# Patient Record
(demographics unavailable — no encounter records)

---

## 2024-11-20 NOTE — DISCUSSION/SUMMARY
[Normal Growth] : growth [Normal Development] : development  [No Elimination Concerns] : elimination [Continue Regimen] : feeding [No Skin Concerns] : skin [Normal Sleep Pattern] : sleep [None] : no medical problems [Anticipatory Guidance Given] : Anticipatory guidance addressed as per the history of present illness section [Physical Growth and Development] : physical growth and development [Social and Academic Competence] : social and academic competence [Emotional Well-Being] : emotional well-being [Risk Reduction] : risk reduction [Violence and Injury Prevention] : violence and injury prevention [No Vaccines] : no vaccines needed [No Medications] : ~He/She~ is not on any medications [Patient] : patient [Parent/Guardian] : Parent/Guardian [Full Activity without restrictions including Physical Education & Athletics] : Full Activity without restrictions including Physical Education & Athletics [] : The components of the vaccine(s) to be administered today are listed in the plan of care. The disease(s) for which the vaccine(s) are intended to prevent and the risks have been discussed with the caretaker.  The risks are also included in the appropriate vaccination information statements which have been provided to the patient's caregiver.  The caregiver has given consent to vaccinate. [Met privately with the adolescent for part of the office visit?] : Met privately with the adolescent for part of the office visit? Yes [Adolescent demonstrates understanding of his/her conditions and how to take prescribed medications?] : Adolescent demonstrates understanding of his/her conditions and how to take prescribed medications? Yes [Adolescent asks questions during each office  visit and participates in the care plan?] : Adolescent asks questions during each office visit and participates in the care plan? Yes [Adolescent is competent in independently making appointments, filling prescriptions, following up on referrals, and seeking emergency services, as needed?] : Adolescent is competent in independently making appointments, filling prescriptions, following up on referrals, and seeking emergency services, as needed? Yes [Discussed using Follow My Health to access health records and communicate with the adolescent's care team?] : Discussed using Follow My Health to access health records and communicate with the adolescent's care team? Yes [Initiated discussion about transfer to an adult healthcare provider?] : Initiated discussion about transfer to an adult healthcare provider? Yes  [Discussed choices for adult care and assist in identifying possible care providers?] : Discussed choices for adult care and assist in identifying possible care providers? Yes [Initiated communication with the adult provider that the family and adolescent has selected?] : Initiated communication with the adult provider that the family and adolescent has selected? No [Provided copy of  transition letter?] : Provided copy of transition letter? No [FreeTextEntry1] : Continue balanced diet with all food groups. Brush teeth twice a day with toothbrush. Recommend visit to dentist. Maintain consistent daily routines and sleep schedule. Personal hygiene, puberty, and sexual health reviewed. Risky behaviors assessed. School discussed. Limit screen time to no more than 2 hours per day. Encourage physical activity. Cardiac questionnaire reviewed, NO issues. Flu, HPV and Hep A Vaccination not carried out due to patient refusal .  I spent adequate time to explain the pros and cons of giving and not giving the vaccines.  Vaccine info sheets were given to patient for reference.  The patient is fully aware of the risks and consequences of refusing to get immunized and accepts them. Discussed in the preferred language of English Return 1 year for routine well child check.

## 2024-11-20 NOTE — BEGINNING OF VISIT
Change of pharmacy     No protocol for requested medication.    Medication: TACROlimus (PROGRAF) 1 MG capsule   Last office visit date: 9/29/2022  [id number] - [place] [id number] [facility] [address]    Order pended, routed to clinician for review.     [Patient] : patient

## 2024-11-20 NOTE — HISTORY OF PRESENT ILLNESS
[Yes] : Patient goes to dentist yearly [Up to date] : Up to date [No] : Patient has not had sexual intercourse [HIV Screening Declined] : HIV Screening Declined [Has ways to cope with stress] : has ways to cope with stress [Displays self-confidence] : displays self-confidence [Has problems with sleep] : does not have problems with sleep [Gets depressed, anxious, or irritable/has mood swings] : does not get depressed, anxious, or irritable/has mood swings [Has thought about hurting self or considered suicide] : has not thought about hurting self or considered suicide [With Teen] : teen [de-identified] : self [FreeTextEntry7] : 18 Y/O WC [de-identified] : none [NO] : No [FreeTextEntry1] : Patient is doing well - has no concerns or issues. No change in health status since last WCC No chest pains or difficulty of breathing reported No reactions to previous vaccinations. Denies depression or psychiatric issues. No mental health issues, not in counseling. No suicidal ideations Appetite good, no issues No new allergies reported Not sexually active Denies tobacco, ETOH, drug use or vaping No tobacco exposure Sleeping well with good sleeping patterns No recent severe illness or injury No emergency room visits No trauma to the head /concussion. 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

## 2024-11-20 NOTE — PHYSICAL EXAM
[Alert] : alert [No Acute Distress] : no acute distress [Normocephalic] : normocephalic [EOMI Bilateral] : EOMI bilateral [Clear tympanic membranes with bony landmarks and light reflex present bilaterally] : clear tympanic membranes with bony landmarks and light reflex present bilaterally  [Pink Nasal Mucosa] : pink nasal mucosa [Nonerythematous Oropharynx] : nonerythematous oropharynx [Supple, full passive range of motion] : supple, full passive range of motion [No Palpable Masses] : no palpable masses [Clear to Auscultation Bilaterally] : clear to auscultation bilaterally [Regular Rate and Rhythm] : regular rate and rhythm [Normal S1, S2 audible] : normal S1, S2 audible [No Murmurs] : no murmurs [+2 Femoral Pulses] : +2 femoral pulses [Soft] : soft [NonTender] : non tender [Non Distended] : non distended [Normoactive Bowel Sounds] : normoactive bowel sounds [No Hepatomegaly] : no hepatomegaly [No Splenomegaly] : no splenomegaly [Cooper: _____] : Cooper [unfilled] [Circumcised] : circumcised [Bilateral descended testes] : bilateral descended testes [No Testicular Masses] : no testicular masses [No Abnormal Lymph Nodes Palpated] : no abnormal lymph nodes palpated [Normal Muscle Tone] : normal muscle tone [No Gait Asymmetry] : no gait asymmetry [No pain or deformities with palpation of bone, muscles, joints] : no pain or deformities with palpation of bone, muscles, joints [Straight] : straight [No Scoliosis] : no scoliosis [+2 Patella DTR] : +2 patella DTR [Cranial Nerves Grossly Intact] : cranial nerves grossly intact [de-identified] : small cyst, upper back right side

## 2024-11-20 NOTE — RISK ASSESSMENT
[0] : 2) Feeling down, depressed, or hopeless: Not at all (0) [PHQ-9 Negative - No further assessment needed] : PHQ-9 Negative - No further assessment needed [Have you ever fainted, passed out or had an unexplained seizure suddenly and without warning, especially during exercise or in response] : Have you ever fainted, passed out or had an unexplained seizure suddenly and without warning, especially during exercise or in response to sudden loud noises such as doorbells, alarm clocks and ringing telephones? No [Have you ever had exercise-related chest pain or shortness of breath?] : Have you ever had exercise-related chest pain or shortness of breath? No [Has anyone in your immediate family (parents, grandparents, siblings) or other more distant relatives (aunts, uncles, cousins)  of heart] : Has anyone in your immediate family (parents, grandparents, siblings) or other more distant relatives (aunts, uncles, cousins)  of heart problems or had an unexpected sudden death before age 50 (This would include unexpected drownings, unexplained car accidents in which the relative was driving or sudden infant death syndrome.)? No [Are you related to anyone with hypertrophic cardiomyopathy or hypertrophic obstructive cardiomyopathy, Marfan syndrome, arrhythmogenic] : Are you related to anyone with hypertrophic cardiomyopathy or hypertrophic obstructive cardiomyopathy, Marfan syndrome, arrhythmogenic right ventricular cardiomyopathy, long QT syndrome, short QT syndrome, Brugada syndrome or catecholaminergic polymorphic ventricular tachycardia, or anyone younger than 50 years with a pacemaker or implantable defibrillator? No [No Increased risk of SCA or SCD] : No Increased risk of SCA or SCD    [Yes] : Discussed with patient. [No] : Patient does not consent to screening.

## 2025-04-16 NOTE — HISTORY OF PRESENT ILLNESS
[de-identified] : Vomiting, fever, and sore throat. [FreeTextEntry6] : Lukas reports that yesterday morning they woke up with a slightly sore throat but felt fine enough to attend school. By nighttime, the symptoms rapidly worsened. The patient experienced vomiting throughout the night at 130 AM, 330 AM, and 830 AM. They report feeling fine after vomiting episodes but symptoms return. The patient also mentions a headache and body aches. They attempted to eat berries last night but vomited them up. The patient reports no history of frequent strep throat infections, indicating this is an unusual occurrence for them. no rash

## 2025-04-16 NOTE — PLAN
[TextEntry] : 20 year boy found to be rapid strep positive. Complete 10 days of antibiotics. Use antipyretics as needed. Return for follow up in 2 weeks is any symptoms remain. Change toothbrush after 2 days on antibiotic, change towels, bed linen, clothes, and clean surfaces. After being on antibiotics for at least 24 hours patient less likely to spread infection.